# Patient Record
Sex: FEMALE | Race: WHITE | URBAN - METROPOLITAN AREA
[De-identification: names, ages, dates, MRNs, and addresses within clinical notes are randomized per-mention and may not be internally consistent; named-entity substitution may affect disease eponyms.]

---

## 2019-08-12 ENCOUNTER — OFFICE VISIT (OUTPATIENT)
Dept: URBAN - METROPOLITAN AREA CLINIC 22 | Facility: CLINIC | Age: 79
End: 2019-08-12
Payer: MEDICARE

## 2019-08-12 PROCEDURE — 99214 OFFICE O/P EST MOD 30 MIN: CPT | Performed by: OPTOMETRIST

## 2019-08-12 RX ORDER — FEXOFENADINE/PSEUDOEPHEDRINE 60MG-120MG
TABLET, EXTENDED RELEASE 12 HR ORAL
Qty: 0 | Refills: 0 | Status: ACTIVE
Start: 2019-08-12

## 2019-08-12 RX ORDER — GLIPIZIDE 10 MG/1
10 MG TABLET ORAL
Qty: 0 | Refills: 0 | Status: ACTIVE
Start: 2019-08-12

## 2019-08-12 RX ORDER — ATORVASTATIN CALCIUM 40 MG/1
40 MG TABLET, FILM COATED ORAL
Qty: 0 | Refills: 0 | Status: ACTIVE
Start: 2019-08-12

## 2019-08-12 ASSESSMENT — INTRAOCULAR PRESSURE
OS: 17
OD: 16

## 2019-08-12 NOTE — IMPRESSION/PLAN
Impression: Age-related nuclear cataract, bilateral: H25.13. OU. Plan: Discussed risks and benefits of cataract surgery with patient today. Patient will continue to monitor at this time  any vision changes or increase in glare symptoms. Patient will be seen in 1 year for CEE unless notices vision changes sooner. Pt edu about surgery once best corrected is worse than 20/40.

## 2019-08-12 NOTE — IMPRESSION/PLAN
Impression: Type 2 diabetes mellitus w/o complication: R63.6. OU. Plan: Recommend yearly diabetic eye exam. Discussed with patient importance of good blood sugar control with regular visits with PCP, compliance with medications, healthy diet and daily exercise.

## 2019-08-12 NOTE — IMPRESSION/PLAN
Impression: Nonexudative age-related macular degeneration, early dry stage: H35.3191. OU. Plan: Macular degeneration dry type. Patient was instructed the use of Amsler grid and will continue monitoring vision. Recommend taking AREDS II vitamins and antioxidants. Patient informed to call office immediately with any vision and / or Amsler grid changes.

## 2020-08-13 ENCOUNTER — OFFICE VISIT (OUTPATIENT)
Dept: URBAN - METROPOLITAN AREA CLINIC 22 | Facility: CLINIC | Age: 80
End: 2020-08-13
Payer: MEDICARE

## 2020-08-13 DIAGNOSIS — H52.4 PRESBYOPIA: ICD-10-CM

## 2020-08-13 DIAGNOSIS — E11.9 TYPE 2 DIABETES MELLITUS W/O COMPLICATION: Primary | ICD-10-CM

## 2020-08-13 PROCEDURE — 92014 COMPRE OPH EXAM EST PT 1/>: CPT | Performed by: OPTOMETRIST

## 2020-08-13 ASSESSMENT — INTRAOCULAR PRESSURE
OD: 16
OS: 17

## 2020-08-13 ASSESSMENT — VISUAL ACUITY
OS: 20/30
OD: 20/30

## 2020-08-13 NOTE — IMPRESSION/PLAN
Impression: Nonexudative age-related macular degeneration, early dry stage: H35.3191 OU. Plan: AMD OD>OS.   Continue with home kennedy and AREDS II

## 2020-08-13 NOTE — IMPRESSION/PLAN
Impression: Type 2 diabetes mellitus w/o complication: K28.9 OU. Plan: Patient ed on importance of good blood glucose control.   Continue visit with PCP and yearly eye exam.

## 2021-10-05 ENCOUNTER — OFFICE VISIT (OUTPATIENT)
Dept: URBAN - METROPOLITAN AREA CLINIC 22 | Facility: CLINIC | Age: 81
End: 2021-10-05
Payer: MEDICARE

## 2021-10-05 DIAGNOSIS — H25.13 AGE-RELATED NUCLEAR CATARACT, BILATERAL: ICD-10-CM

## 2021-10-05 DIAGNOSIS — H35.3191 NONEXUDATIVE AGE-RELATED MACULAR DEGENERATION, UNSPECIFIED EYE, EARLY DRY STAGE: ICD-10-CM

## 2021-10-05 DIAGNOSIS — H52.03 HYPERMETROPIA, BILATERAL: ICD-10-CM

## 2021-10-05 PROCEDURE — 92014 COMPRE OPH EXAM EST PT 1/>: CPT | Performed by: STUDENT IN AN ORGANIZED HEALTH CARE EDUCATION/TRAINING PROGRAM

## 2021-10-05 PROCEDURE — 92134 CPTRZ OPH DX IMG PST SGM RTA: CPT | Performed by: STUDENT IN AN ORGANIZED HEALTH CARE EDUCATION/TRAINING PROGRAM

## 2021-10-05 ASSESSMENT — INTRAOCULAR PRESSURE
OS: 15
OD: 12

## 2021-10-05 ASSESSMENT — VISUAL ACUITY
OS: 20/30
OD: 20/30

## 2021-10-05 NOTE — IMPRESSION/PLAN
Impression: Type 2 diabetes mellitus w/o complication: P83.3. Plan: Discussed findings, no retinopathy or macular edema OU. Pt educated on importance of controlled blood sugar/pressure and annual dilated eye exams. Continue systemic management with PCP.

## 2021-10-05 NOTE — IMPRESSION/PLAN
Impression: Nonexudative age-related macular degeneration, unspecified eye, early dry stage: H35.3191. Plan: Discussed findings. OCT ordered, shows no SRF, dry AMD OU. Stable vision OU today. Continue to monitor with annual dilated exam and OCT. Patient directed to RTC sooner if any sudden changes to vision. Recommend AREDS 2, UV protection and healthy diet rich in antioxidants.

## 2022-11-21 ENCOUNTER — OFFICE VISIT (OUTPATIENT)
Dept: URBAN - METROPOLITAN AREA CLINIC 22 | Facility: CLINIC | Age: 82
End: 2022-11-21
Payer: MEDICARE

## 2022-11-21 DIAGNOSIS — H35.3131 BILATERAL NONEXUDATIVE AGE-RELATED MACULAR DEGENERATION, EARLY DRY STAGE: ICD-10-CM

## 2022-11-21 DIAGNOSIS — H25.813 COMBINED FORMS OF AGE-RELATED CATARACT, BILATERAL: ICD-10-CM

## 2022-11-21 DIAGNOSIS — Z79.85 LONG-TERM (CURRENT) USE OF INJECTABLE NON-INSULIN ANTIDIABETIC DRUGS: ICD-10-CM

## 2022-11-21 DIAGNOSIS — E11.9 TYPE 2 DIABETES MELLITUS W/O COMPLICATION: Primary | ICD-10-CM

## 2022-11-21 DIAGNOSIS — H52.03 HYPERMETROPIA, BILATERAL: ICD-10-CM

## 2022-11-21 PROCEDURE — 92134 CPTRZ OPH DX IMG PST SGM RTA: CPT | Performed by: STUDENT IN AN ORGANIZED HEALTH CARE EDUCATION/TRAINING PROGRAM

## 2022-11-21 PROCEDURE — 92014 COMPRE OPH EXAM EST PT 1/>: CPT | Performed by: STUDENT IN AN ORGANIZED HEALTH CARE EDUCATION/TRAINING PROGRAM

## 2022-11-21 ASSESSMENT — INTRAOCULAR PRESSURE
OD: 15
OS: 15

## 2022-11-21 ASSESSMENT — VISUAL ACUITY
OD: 20/30
OS: 20/30

## 2022-11-21 NOTE — IMPRESSION/PLAN
Impression: Bilateral nonexudative age-related macular degeneration, early dry stage: H35.3131. Plan: Discussed findings. OCT ordered: no SRF, dry AMD OU. Stable vision OU. Continue to monitor with annual dilated exam and OCT. Patient directed to RTC sooner if any sudden changes to vision. Recommend AREDS 2, UV protection and healthy diet rich in antioxidants.

## 2022-11-21 NOTE — IMPRESSION/PLAN
Impression: Type 2 diabetes mellitus w/o complication: L46.0 OU. Plan: Discussed findings, no retinopathy or macular edema OU. Patient educated on importance of well-controlled blood sugar/pressure, risk of vision loss from diabetic retinopathy, and dilated eye exams. Continue management with PCP.

## 2022-11-21 NOTE — IMPRESSION/PLAN
Impression: Long-term (current) use of injectable non-insulin antidiabetic drugs: Z79.85. Plan: See #1.